# Patient Record
Sex: MALE | Race: WHITE | URBAN - METROPOLITAN AREA
[De-identification: names, ages, dates, MRNs, and addresses within clinical notes are randomized per-mention and may not be internally consistent; named-entity substitution may affect disease eponyms.]

---

## 2017-06-22 ENCOUNTER — THERAPY VISIT (OUTPATIENT)
Dept: PHYSICAL THERAPY | Facility: CLINIC | Age: 62
End: 2017-06-22
Payer: COMMERCIAL

## 2017-06-22 DIAGNOSIS — M25.521 RIGHT ELBOW PAIN: Primary | ICD-10-CM

## 2017-06-22 PROCEDURE — 97110 THERAPEUTIC EXERCISES: CPT | Mod: GP | Performed by: PHYSICAL THERAPIST

## 2017-06-22 PROCEDURE — 97140 MANUAL THERAPY 1/> REGIONS: CPT | Mod: GP | Performed by: PHYSICAL THERAPIST

## 2017-06-22 PROCEDURE — 97162 PT EVAL MOD COMPLEX 30 MIN: CPT | Mod: GP | Performed by: PHYSICAL THERAPIST

## 2017-06-22 NOTE — PROGRESS NOTES
[facility] Initial Evaluation        Subjective:    Patient is a 61 year old male presenting with rehab right elbow hpi.   Jack Kam is a 61 year old male with a right elbow condition.      This is a chronic condition  6/16/17 saw MD for R elbow pain that began about 10 years ago due to playing racquetball.  Has gotten worse over the years but never had treatment.  Has not played [employer] since last summer when screwed up the R shoulder.  .    Patient reports pain:  Lateral and medial.  Radiates to:  Wrist.  Pain is described as aching and sharp and is constant and reported as 7/10 (constant 1-7/10).  Associated symptoms:  Tingling, numbness and loss of motion/stiffness (T/N down R arm to wrist (occasional)).   Symptoms are exacerbated by other (screw , hard to tell as is left handed, rolling/painting, repetitively reaching overhead, unable to play racquetball) and relieved by bracing/immobilizing.  Since onset symptoms are gradually worsening.    Previous treatment includes chiropractic.  There was no improvement following previous treatment.  General health as reported by patient is good.  Past medical history: MRI R shoulder RCT and went to therapy and got better (now getting worse), occasional HA after looking up/painting overhead.  Medical allergies: no.  Other surgeries include:  No.  Current medications:  None as reported by the patient.  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        Barriers include:  None as reported by the patient.    Red flags:  Pain at rest/night.                        Objective:          Flexibility/Screens:   Positive screens:  Cervical (-) cervical spurling quadrant testing except mild facet impingement with ext + R rotation                             Shoulder Evaluation:  ROM:  AROM:  : Repeated R elbow extension increase, NW.  Flexion:  Left:  WNL    Right:  WNL    Abduction:  Left: WNL   Right:  WNL + R richa  pain      External Rotation:  Left:  70    Right:  60 + shoulder painWNL            Extension/Internal Rotation:  Left:  T7    Right:  T10 + shoulder and elbow pain          Strength:  : fair to good scapular stabilization.                                 ROM:  AROM:  normal    Flexion Elbow:  Right:WNL  Extension Elbow:  Right: WNL  Flexion Wrist:  Left: slight limitation, equal B    Right: slight limitation, equal B  Extension Wrist:  Right:  WNL  Supination Elbow/Wrist:  Right: WNL with strain at elbow  Pronation Elbow/Wrist:  Right: WNL with strain at elbow  Radial Deviation:  Right: WNL  Ulnar Deviation:  Right: mild pain motion WNL            Strength:        Flexion Wrist:  Left: 5/5 Pain:    Right: 5/5 Pain:  Extension Wrist: Left: 5/5 Pain:  Right: 5/5 Strong/pain free  Pain:  Supination Elbow/Wrist: Left: 5/5 Pain:    Right: 4+/5 Weak/painful  Pain:  Pronation Elbow/Wrist: Left: 5/5 Pain:        Right: 4+/5 Weak/painful  Pain:          Special Testing:  Special tests elbow/wrist: Mild strain felt at elbow with R median nerve testing, (-) ulnar and (-) radial nerve ULTT B.  Left wrist/elbow negative for the following special tests:   Lateral Epicondylitis or Medial Epicondylitis  Right wrist/elbow negative for the following special tests: Lateral Epicondylitis or Medial Epicondylitis  Palpation:  Palpation elbow/wrist: tenderness over R supinator.    Right wrist/elbow tenderness present at:Lateral Epicondyle; Pronator Teres; Wrist Extensors and TricepsRight wrist/elbow tenderness not present at:Medial Epicondyle or Olecranon Bursa                                General     ROS    Assessment/Plan:      Patient is a 61 year old male with right side elbow complaints.    Patient has the following significant findings with corresponding treatment plan.                Diagnosis 1:  R elbow pain    Pain -  hot/cold therapy, US, manual therapy, self management, education and home program  Decreased ROM/flexibility  - manual therapy, therapeutic exercise and home program  Decreased strength - therapeutic exercise, therapeutic activities and home program  Decreased function - therapeutic activities and home program  Impaired posture - neuro re-education and home program    Therapy Evaluation Codes:   1) History comprised of:   Personal factors that impact the plan of care:      Time since onset of symptoms.    Comorbidity factors that impact the plan of care are:      Numbness/tingling and Pain at night/rest.     Medications impacting care: None.  2) Examination of Body Systems comprised of:   Body structures and functions that impact the plan of care:      Elbow, Shoulder and Wrist.   Activity limitations that impact the plan of care are:      Sports and repetitious pronation/supination, reaching, working.  3) Clinical presentation characteristics are:   Evolving/Changing.  4) Decision-Making    Moderate complexity using standardized patient assessment instrument and/or measureable assessment of functional outcome.  Cumulative Therapy Evaluation is: Moderate complexity.    Previous and current functional limitations:  (See Goal Flow Sheet for this information)    Short term and Long term goals: (See Goal Flow Sheet for this information)     Communication ability:  Patient appears to be able to clearly communicate and understand verbal and written communication and follow directions correctly.  Treatment Explanation - The following has been discussed with the patient:   RX ordered/plan of care  Anticipated outcomes  Possible risks and side effects  This patient would benefit from PT intervention to resume normal activities.   Rehab potential is good.    Frequency:  1 X week, once daily  Duration:  for 6 weeks  Discharge Plan:  Achieve all LTG.  Independent in home treatment program.  Reach maximal therapeutic benefit.    Please refer to the daily flowsheet for treatment today, total treatment time and time spent performing 1:1  timed codes.

## 2017-06-22 NOTE — MR AVS SNAPSHOT
"              After Visit Summary   6/22/2017    Jack Kam    MRN: [id number]           Patient Information     Date Of Birth          1955        Visit Information        Provider Department      6/22/2017 4:00 PM Sarah Maldonado PT [facility]        Today's Diagnoses     Right elbow pain    -  1       Follow-ups after your visit        Your next 10 appointments already scheduled     Jun 29, 2017 11:30 AM CDT   [facility] Extremity with Sarah Maldonado PT   [facility] ([facility] [place])    [address]  [place] [address]   [phone]              Who to contact     If you have questions or need follow up information about today's clinic visit or your schedule please contact [facility] directly at [phone].  Normal or non-critical lab and imaging results will be communicated to you by MyChart, letter or phone within 4 business days after the clinic has received the results. If you do not hear from us within 7 days, please contact the clinic through [employer] or phone. If you have a critical or abnormal lab result, we will notify you by phone as soon as possible.  Submit refill requests through [employer] or call your pharmacy and they will forward the refill request to us. Please allow 3 business days for your refill to be completed.          Additional Information About Your Visit        MyChart Information     [employer] lets you send messages to your doctor, view your test results, renew your prescriptions, schedule appointments and more. To sign up, go to [email or web address] . Click on \"Log in\" on the left side of the screen, which will take you to the Welcome page. Then click on \"Sign up Now\" on the right side of the page.     You will be asked to enter the access code listed below, as well as some personal information. " Please follow the directions to create your username and password.     Your access code is: [email or web address]  Expires: 2017 12:37 AM     Your access code will  in 90 days. If you need help or a new code, please call your [facility] clinic or [phone].        Care EveryWhere ID     This is your Care EveryWhere ID. This could be used by other organizations to access your [facility] medical records  [id number]         Blood Pressure from Last 3 Encounters:   No data found for BP    Weight from Last 3 Encounters:   No data found for Wt              We Performed the Following     HC PT EVAL, MODERATE COMPLEXITY     NGU[facility]N INITIAL EVAL REPORT     MANUAL THER TECH,1+REGIONS,EA 15 MIN     THERAPEUTIC EXERCISES        Primary Care Provider    None Specified       No primary provider on file.        Equal Access to Services     VARSHA SAHNI : Moon Friedman, waaxda luqadaha, qaybta kaalmada adeteresayazuhair, janae flood . So [facility] [phone].    ATENCIÓN: Si habla español, tiene a bardales disposición servicios gratuitos de asistencia lingüística. Llame al [phone].    We comply with applicable federal civil rights laws and [place] laws. We do not discriminate on the basis of race, color, national origin, age, disability sex, sexual orientation or gender identity.            Thank you!     Thank you for choosing [facility]  for your care. Our goal is always to provide you with excellent care. Hearing back from our patients is one way we can continue to improve our services. Please take a few minutes to complete the written survey that you may receive in the mail after your visit with us. Thank you!             Your Updated Medication List - Protect others around you: Learn how to safely use, store and throw away your medicines at www.disposemymeds.org.      Notice  As of 2017 11:59 PM    You have not been prescribed any  medications.

## 2017-06-22 NOTE — LETTER
[facility]  [address]  [place] [address]  [phone]    2017    Re: Jack Kam   :   1955  MRN:  [id number]   REFERRING PHYSICIAN:   Skip Camargo    [facility]    Date of Initial Evaluation:  2017  Visits:  Rxs Used: 1  Reason for Referral:  Right elbow pain    EVALUATION SUMMARY    [facility] Evaluation    Subjective:    Patient is a 61 year old male presenting with rehab right elbow hpi.   Jack Kam is a 61 year old male with a right elbow condition.      This is a chronic condition  17 saw MD for R elbow pain that began about 10 years ago due to playing racquetball.  Has gotten worse over the years but never had treatment.  [place] when screwed up the R shoulder.  .    Patient reports pain:  Lateral and medial.  Radiates to:  Wrist.  Pain is described as aching and sharp and is constant and reported as 7/10 (constant 1-7/10).  Associated symptoms:  Tingling, numbness and loss of motion/stiffness (T/N down R arm to wrist (occasional)).   Symptoms are exacerbated by other (screw , hard to tell as is left handed, rolling/painting, repetitively reaching overhead, unable to play racquetball) and relieved by bracing/immobilizing.  Since onset symptoms are gradually worsening.    Previous treatment includes chiropractic.  There was no improvement following previous treatment.  General health as reported by patient is good.  Past medical history: MRI R shoulder RCT and went to therapy and got better (now getting worse), occasional HA after looking up/painting overhead.  Medical allergies: no.  Other surgeries include:  No.  Current medications:  None as reported by the patient.  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        Barriers include:  None as  reported by the patient.    Red flags:  Pain at rest/night.                          Re: Jack Kam   :   1955    Objective:  Flexibility/Screens:   Positive screens:  Cervical (-) cervical spurling quadrant testing except mild facet impingement with ext + R rotation    Shoulder Evaluation:  ROM:  AROM:  : Repeated R elbow extension increase, NW.  Flexion:  Left:  WNL    Right:  WNL  Abduction:  Left: WNL   Right:  WNL + R shoudler pain  External Rotation:  Left:  70    Right:  60 + shoulder painWNL  Extension/Internal Rotation:  Left:  T7    Right:  T10 + shoulder and elbow pain      Strength:  : fair to good scapular stabilization.  ROM:  AROM:  normal  Flexion Elbow:  Right:WNL  Extension Elbow:  Right: WNL  Flexion Wrist:  Left: slight limitation, equal B    Right: slight limitation, equal B  Extension Wrist:  Right:  WNL  Supination Elbow/Wrist:  Right: WNL with strain at elbow  Pronation Elbow/Wrist:  Right: WNL with strain at elbow  Radial Deviation:  Right: WNL  Ulnar Deviation:  Right: mild pain motion WNL        Strength:    Flexion Wrist:  Left: 5/5 Pain:    Right: 5/5 Pain:  Extension Wrist: Left: 5/5 Pain:  Right: 5/5 Strong/pain free  Pain:  Supination Elbow/Wrist: Left: 5/5 Pain:    Right: 4+/5 Weak/painful  Pain:  Pronation Elbow/Wrist: Left: 5/5 Pain:        Right: 4+/5 Weak/painful  Pain:    Special Testing:  Special tests elbow/wrist: Mild strain felt at elbow with R median nerve testing, (-) ulnar and (-) radial nerve ULTT B.  Left wrist/elbow negative for the following special tests:   Lateral Epicondylitis or Medial Epicondylitis  Right wrist/elbow negative for the following special tests: Lateral Epicondylitis or Medial Epicondylitis  Palpation:  Palpation elbow/wrist: tenderness over R supinator.    Right wrist/elbow tenderness present at:Lateral Epicondyle; Pronator Teres; Wrist Extensors and TricepsRight wrist/elbow tenderness not present at:Medial Epicondyle or Olecranon Bursa                 Re: Jack Kam   :   1955    Assessment/Plan:      Patient is a 61 year old male with right side elbow complaints.    Patient has the following significant findings with corresponding treatment plan.                Diagnosis 1:  R elbow pain    Pain -  hot/cold therapy, US, manual therapy, self management, education and home program  Decreased ROM/flexibility - manual therapy, therapeutic exercise and home program  Decreased strength - therapeutic exercise, therapeutic activities and home program  Decreased function - therapeutic activities and home program  Impaired posture - neuro re-education and home program    Therapy Evaluation Codes:   1) History comprised of:   Personal factors that impact the plan of care:      Time since onset of symptoms.    Comorbidity factors that impact the plan of care are:      Numbness/tingling and Pain at night/rest.     Medications impacting care: None.  2) Examination of Body Systems comprised of:   Body structures and functions that impact the plan of care:      Elbow, Shoulder and Wrist.   Activity limitations that impact the plan of care are:      Sports and repetitious pronation/supination, reaching, working.  3) Clinical presentation characteristics are:   Evolving/Changing.  4) Decision-Making    Moderate complexity using standardized patient assessment instrument and/or measureable assessment of functional outcome.  Cumulative Therapy Evaluation is: Moderate complexity.    Previous and current functional limitations:  (See Goal Flow Sheet for this information)    Short term and Long term goals: (See Goal Flow Sheet for this information)     Communication ability:  Patient appears to be able to clearly communicate and understand verbal and written communication and follow directions correctly.  Treatment Explanation - The following has been discussed with the patient:   RX ordered/plan of care  Anticipated outcomes  Possible risks and side  effects  This patient would benefit from PT intervention to resume normal activities.   Rehab potential is good.            Re: Jack Kam   :   1955    Frequency:  1 X week, once daily  Duration:  for 6 weeks  Discharge Plan:  Achieve all LTG.  Independent in home treatment program.  Reach maximal therapeutic benefit.    Please refer to the daily flowsheet for treatment today, total treatment time and time spent performing 1:1 timed codes.     Thank you for your referral.    INQUIRIES  Therapist: Sarah Maldonado, PT  [facility]  [address] [address]  Phone: [phone]  Fax: [phone]

## 2017-10-02 PROBLEM — M25.521 RIGHT ELBOW PAIN: Status: RESOLVED | Noted: 2017-06-22 | Resolved: 2017-10-02
